# Patient Record
(demographics unavailable — no encounter records)

---

## 2018-01-26 NOTE — DIAGNOSTIC IMAGING REPORT
ULTRASOUND RIGHT LOWER EXTREMITY VENOUS 



CLINICAL HISTORY: Right leg pain.



COMPARISON STUDY: No priors.



TECHNIQUE: Real-time, grayscale, and color Doppler sonography of the deep veins

of the right lower extremity was performed from the inguinal crease to the calf.

Compression and augmentation were utilized. 



FINDINGS: There is no sonographic evidence of deep venous thrombosis identified

in the right lower extremity. The common femoral, superficial femoral, and

popliteal veins are patent and normally compressible. The greater saphenous vein

and the profunda femoris vein at the junction with the common femoral vein are

clear. The visualized calf veins are patent.



IMPRESSION: There is no sonographic evidence of deep venous thrombosis

identified in the right lower extremity.







Electronically signed by:  Sukh Duong M.D.

1/26/2018 10:07 AM



Dictated Date/Time:  1/26/2018 10:06 AM

## 2018-02-22 NOTE — DIAGNOSTIC IMAGING REPORT
RIGHT KNEE MRI



HISTORY:      Right knee pain.



COMPARISON STUDY:  None.



TECHNIQUE: Multiplanar multisequence MRI of the right knee was performed

according to standard department protocol without the use of contrast. 



FINDINGS:



Menisci: There is an oblique tear involving the anterior horn of the lateral

meniscus. There is also a complex full-thickness tear at the posterior horn of

the medial meniscus with extension of an oblique tear to the body of the medial

meniscus. The medial meniscus is slightly extruded from the joint space.



Ligaments: The anterior and posterior cruciate ligaments are intact. The medial

and lateral collateral ligaments are normal in appearance.



Extensor mechanism: The quadriceps tendon and patellar ligament are intact.



Articular cartilage and bone: No fracture or dislocation. Small tricompartmental

marginal osteophytes. There is full-thickness cartilage loss seen within the

lateral patellar facet and near full-thickness cartilage loss within the medial

patellar facet and median ridge. Mild cartilage thinning within the lateral

compartment of the knee. There is full-thickness cartilage loss at the central

weightbearing portion of the medial femoral condyle. The area of cartilage loss

measures approximately 13 x 11 mm.



Joint effusion: Trace joint effusion.



Soft tissues:  Intact.



IMPRESSION:  



1. Medial and lateral meniscal tears as described above.

2. 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

3. Trace knee effusion. 







Electronically signed by:  Everton Parish M.D.

2/22/2018 4:38 PM



Dictated Date/Time:  2/22/2018 4:28 PM